# Patient Record
Sex: MALE | Race: WHITE
[De-identification: names, ages, dates, MRNs, and addresses within clinical notes are randomized per-mention and may not be internally consistent; named-entity substitution may affect disease eponyms.]

---
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
## 2022-08-27 ENCOUNTER — HOSPITAL ENCOUNTER (EMERGENCY)
Dept: HOSPITAL 52 - LL.ED | Age: 22
Discharge: HOME | End: 2022-08-27
Payer: COMMERCIAL

## 2022-08-27 VITALS — DIASTOLIC BLOOD PRESSURE: 72 MMHG | HEART RATE: 103 BPM | SYSTOLIC BLOOD PRESSURE: 119 MMHG

## 2022-08-27 DIAGNOSIS — J36: ICD-10-CM

## 2022-08-27 DIAGNOSIS — E66.9: ICD-10-CM

## 2022-08-27 DIAGNOSIS — Z72.0: ICD-10-CM

## 2022-08-27 DIAGNOSIS — J03.90: Primary | ICD-10-CM

## 2022-08-27 DIAGNOSIS — Z88.1: ICD-10-CM

## 2022-08-27 DIAGNOSIS — Z88.2: ICD-10-CM

## 2022-08-27 DIAGNOSIS — Z79.899: ICD-10-CM

## 2022-08-27 DIAGNOSIS — Z20.822: ICD-10-CM

## 2022-08-27 LAB
ANION GAP SERPL CALC-SCNC: 8.9 MEQ/L (ref 7–15)
CHLORIDE SERPL-SCNC: 106 MMOL/L (ref 98–107)
EGFRCR SERPLBLD CKD-EPI 2021: 130 ML/MIN (ref 60–?)
RSV RNA UPPER RESP QL NAA+PROBE: NEGATIVE
SARS-COV-2 RNA RESP QL NAA+PROBE: NEGATIVE
SODIUM SERPL-SCNC: 141 MMOL/L (ref 136–145)